# Patient Record
Sex: MALE | Race: BLACK OR AFRICAN AMERICAN | NOT HISPANIC OR LATINO | Employment: FULL TIME | URBAN - METROPOLITAN AREA
[De-identification: names, ages, dates, MRNs, and addresses within clinical notes are randomized per-mention and may not be internally consistent; named-entity substitution may affect disease eponyms.]

---

## 2024-05-14 ENCOUNTER — APPOINTMENT (OUTPATIENT)
Dept: RADIOLOGY | Facility: HOSPITAL | Age: 39
End: 2024-05-14
Payer: COMMERCIAL

## 2024-05-14 ENCOUNTER — HOSPITAL ENCOUNTER (EMERGENCY)
Facility: HOSPITAL | Age: 39
Discharge: HOME | End: 2024-05-14
Payer: COMMERCIAL

## 2024-05-14 VITALS
DIASTOLIC BLOOD PRESSURE: 81 MMHG | HEART RATE: 74 BPM | BODY MASS INDEX: 31.84 KG/M2 | SYSTOLIC BLOOD PRESSURE: 129 MMHG | HEIGHT: 69 IN | WEIGHT: 215 LBS | TEMPERATURE: 98.1 F | RESPIRATION RATE: 14 BRPM | OXYGEN SATURATION: 96 %

## 2024-05-14 DIAGNOSIS — S99.919A ANKLE INJURY, INITIAL ENCOUNTER: ICD-10-CM

## 2024-05-14 DIAGNOSIS — M25.572 ACUTE LEFT ANKLE PAIN: Primary | ICD-10-CM

## 2024-05-14 PROCEDURE — 73630 X-RAY EXAM OF FOOT: CPT | Mod: LEFT SIDE | Performed by: RADIOLOGY

## 2024-05-14 PROCEDURE — 73610 X-RAY EXAM OF ANKLE: CPT | Mod: LEFT SIDE | Performed by: RADIOLOGY

## 2024-05-14 PROCEDURE — 99284 EMERGENCY DEPT VISIT MOD MDM: CPT

## 2024-05-14 PROCEDURE — 73610 X-RAY EXAM OF ANKLE: CPT | Mod: LT

## 2024-05-14 PROCEDURE — 73630 X-RAY EXAM OF FOOT: CPT | Mod: LT

## 2024-05-14 RX ORDER — IBUPROFEN 600 MG/1
600 TABLET ORAL EVERY 6 HOURS PRN
Qty: 28 TABLET | Refills: 0 | Status: SHIPPED | OUTPATIENT
Start: 2024-05-14 | End: 2024-05-21

## 2024-05-14 ASSESSMENT — COLUMBIA-SUICIDE SEVERITY RATING SCALE - C-SSRS
2. HAVE YOU ACTUALLY HAD ANY THOUGHTS OF KILLING YOURSELF?: NO
1. IN THE PAST MONTH, HAVE YOU WISHED YOU WERE DEAD OR WISHED YOU COULD GO TO SLEEP AND NOT WAKE UP?: NO
6. HAVE YOU EVER DONE ANYTHING, STARTED TO DO ANYTHING, OR PREPARED TO DO ANYTHING TO END YOUR LIFE?: NO

## 2024-05-14 ASSESSMENT — LIFESTYLE VARIABLES
HAVE YOU EVER FELT YOU SHOULD CUT DOWN ON YOUR DRINKING: NO
TOTAL SCORE: 0
EVER HAD A DRINK FIRST THING IN THE MORNING TO STEADY YOUR NERVES TO GET RID OF A HANGOVER: NO
EVER FELT BAD OR GUILTY ABOUT YOUR DRINKING: NO
HAVE PEOPLE ANNOYED YOU BY CRITICIZING YOUR DRINKING: NO

## 2024-05-14 ASSESSMENT — PAIN DESCRIPTION - LOCATION: LOCATION: ANKLE

## 2024-05-14 ASSESSMENT — PAIN - FUNCTIONAL ASSESSMENT: PAIN_FUNCTIONAL_ASSESSMENT: 0-10

## 2024-05-14 ASSESSMENT — PAIN SCALES - GENERAL: PAINLEVEL_OUTOF10: 8

## 2024-05-14 ASSESSMENT — PAIN DESCRIPTION - ORIENTATION: ORIENTATION: LEFT

## 2024-05-14 ASSESSMENT — PAIN DESCRIPTION - PAIN TYPE: TYPE: ACUTE PAIN

## 2024-05-14 NOTE — Clinical Note
Hakan Hawthorne was seen and treated in our emergency department on 5/14/2024.  He may return to work on 05/21/2024.       If you have any questions or concerns, please don't hesitate to call.      Mell Morin, APRN-CNP

## 2024-05-14 NOTE — ED TRIAGE NOTES
Patient was playing basketball yesterday and twisted his ankle. Patient is experiencing pain in swelling his left ankle.

## 2024-05-14 NOTE — ED PROVIDER NOTES
Emergency Department Encounter  [facility] EMERGENCY MEDICINE    Patient: Hakan Hawthorne  MRN: [id number]  : 1985  Date of Evaluation: 2024  ED Provider: RISHI Salinas      Chief Complaint       Chief Complaint   Patient presents with    Ankle Pain     [place]    (Location/Symptom, Timing/Onset, Context/Setting, Quality, Duration, Modifying Factors, Severity) Note limiting factors.   Limitations to History: None  Historian: Patient  Records reviewed: EMR inpatient and outpatient notes, Care Everywhere      Hakan Hawthorne is a 38 y.o. male  presents to the emergency department for left foot pain.  He states while playing basketball yesterday he jumped up to make a rebound, he came down and his foot was introverted.  He reports immediate pain after injury and pain with walking.  He denies fever, decrease in sensation, decrease in circulation, numbness or tingling.    ROS:     Review of Systems  14 systems reviewed and otherwise acutely negative except as in the [place].          Past History   No past medical history on file.  No past surgical history on file.  Social History     Socioeconomic History    Marital status: Single     Spouse name: Not on file    Number of children: Not on file    Years of education: Not on file    Highest education level: Not on file   Occupational History    Not on file   Tobacco Use    Smoking status: Not on file    Smokeless tobacco: Not on file   Substance and Sexual Activity    Alcohol use: Not on file    Drug use: Not on file    Sexual activity: Not on file   Other Topics Concern    Not on file   Social History Narrative    Not on file     Social Determinants of Health     Financial Resource Strain: Not on file   Food Insecurity: Not on file   Transportation Needs: Not on file   Physical Activity: Not on file   Stress: Not on file   Social Connections: Not on file   Intimate Partner Violence: Not on file   Housing Stability: Not on file        Medications/Allergies     Previous Medications    No medications on file     Not on File     Physical Exam       ED Triage Vitals [05/14/24 1638]   Temperature Heart Rate Respirations BP   36.7 °C (98.1 °F) 74 14 129/81      Pulse Ox Temp src Heart Rate Source Patient Position   96 % -- -- --      BP Location FiO2 (%)     -- 21 %         Physical Exam  Vitals and nursing note reviewed.   Constitutional:       General: He is not in acute distress.     Appearance: He is not toxic-appearing.   HENT:      Left Ear: External ear normal.      Nose: Nose normal.      Mouth/Throat:      Mouth: Mucous membranes are moist.      Pharynx: Oropharynx is clear.   Eyes:      General:         Left eye: No discharge.      Extraocular Movements: Extraocular movements intact.   Cardiovascular:      Rate and Rhythm: Normal rate and regular rhythm.      Pulses: Normal pulses.      Heart sounds: Normal heart sounds.   Pulmonary:      Effort: Pulmonary effort is normal.      Breath sounds: Normal breath sounds.   Abdominal:      General: Bowel sounds are normal.      Palpations: Abdomen is soft.   Musculoskeletal:      Cervical back: Normal range of motion.      Right ankle: Normal.      Left ankle: Swelling present. No deformity, ecchymosis or lacerations. Tenderness present. Normal range of motion. Normal pulse.      Right foot: Normal.      Left foot: Normal range of motion and normal capillary refill. Tenderness present. No swelling, deformity, laceration or crepitus. Normal pulse.      Comments: There is swelling to the medial and lateral aspect of the left ankle.  There is tenderness of the left lateral ankle extending to the forefoot.  No obvious deformity.  Distal pulses intact.  Brisk capillary refills.  Pain with passive range of motion.   Skin:     General: Skin is warm and dry.   Neurological:      General: No focal deficit present.      Mental Status: He is alert and oriented to person, place, and time.   Psychiatric:          Mood and Affect: Mood normal.         Behavior: Behavior normal.         Thought Content: Thought content normal.           Diagnostics   Labs:  Labs Reviewed - No data to display  Radiographs:  XR ankle left 3+ views   Final Result   There appears to be a tiny avulsed fracture fragment from the dorsal   aspect of the anterior navicular. On the AP view there is also a small   ununited bony fragment along the anterolateral aspect of the   calcaneus. I suspect both of these are old findings although would be   difficult to exclude an acute injury. Correlate with symptoms. Bony   structures of the left foot and ankle otherwise appear intact.   Signed by Willie Aguirre MD      XR foot left 3+ views   Final Result   There appears to be a tiny avulsed fracture fragment from the dorsal   aspect of the anterior navicular. On the AP view there is also a small   ununited bony fragment along the anterolateral aspect of the   calcaneus. I suspect both of these are old findings although would be   difficult to exclude an acute injury. Correlate with symptoms. Bony   structures of the left foot and ankle otherwise appear intact.   Signed by Willie Aguirre MD            Procedures:       EKG: interpreted by this provider  Time:  Indication:  Rate:  Rhythm:  Axis:  Interval:  ST Segment:  Comparison to Prior:      Medical decision making   In brief, Hakan Hawthorne is a 38 y.o. male who presented to the emergency department for left ankle and foot pain s/p injury.  Vitals reviewed.  Upon examination there is swelling to the medial and lateral aspect of the left ankle.  There is tenderness of the left lateral ankle extending to the forefoot.  No obvious deformity.  Distal pulses intact.  Brisk capillary refills.  Pain with passive range of motion..  Differential diagnoses considered include:  1.  Fracture  2.  Sprain versus strain  3.  Muscular pathology  Medical work up includes x-rays.  XR of the left ankle shows a tiny  avulsive fracture fragment from the dorsal aspect of the anterior navicular, age indeterminant, there is also bilateral ankle swelling.  XR of the left foot shows small ununited bony fragment along the anterolateral aspect of the calculus.  Patient declined pain medication.  Ace wrap and Aircast placed to the left ankle.  Patient provided crutches and educated to follow-up with orthopedic in 1 week.  Patient educated not to remove the Ace wrap or Aircast until follow-up with orthopedic services.  Patient verbalized understanding.  Patient provided ibuprofen for discomfort  I discussed the differential, results and discharge plan with the patient. I emphasized the importance of follow-up with the physician I referred them to in the timeframe recommended. I explained reasons for the patient to return to the emergency department. Questions were addressed. They understand return precautions and discharge instructions. The patient  expressed understanding.            ED Course as of 05/14/24 2041 Tue May 14, 2024   1950 XR ankle left 3+ views  There appears to be a tiny avulsed fracture fragment from the dorsal aspect of the anterior navicular. This is age  indeterminate. No other fracture is apparent at the ankle.  The alignment is anatomic.  There is bilateral soft tissue swelling. [ED]   2040 XR foot left 3+ views  There appears to be a tiny avulsed fracture fragment from  the dorsal aspect of the anterior navicular. On the AP view there is  also a small ununited bony fragment along the anterolateral aspect of  the calcaneus. I suspect both of these are old findings although would  be difficult to exclude an acute injury. Correlate with symptoms.  The  alignment is anatomic.  No soft tissue abnormality is seen.   [ED]      ED Course User Index  [ED] RYANNE Salinas-CNP         Diagnoses as of 05/14/24 2041   Acute left ankle pain   Ankle injury, initial encounter      Visit Vitals  /81   Pulse 74   Temp  "36.7 °C (98.1 °F)   Resp 14   Ht 1.753 m (5' 9\")   Wt 97.5 kg (215 lb)   SpO2 96%   BMI 31.75 kg/m²   BSA 2.18 m²       Medications - No data to display          Final Impression    Ankle injury  Left ankle pain    DISPOSITION  Disposition: Discharge  Patient condition is: Stable    Comment: Please note this report has been produced using speech recognition software and may contain errors related to that system including errors in grammar, punctuation, and spelling, as well as words and phrases that may be inappropriate.  If there are any questions or concerns please feel free to contact the dictating provider for clarification.    RISHI Salinas  [facility]  Emergency department     RISHI Salinas  05/14/24 2052    "

## 2024-05-14 NOTE — Clinical Note
Hakan Hawthorne was seen and treated in our emergency department on 5/14/2024.  He may return to work on 05/17/2024.       If you have any questions or concerns, please don't hesitate to call.      Mell Morin, APRN-CNP

## 2024-05-15 NOTE — DISCHARGE INSTRUCTIONS
Rest, apply ice 4 times a day for 15 to 20 minutes.  Elevate.  May take ibuprofen as needed for discomfort  Utilize Aircast and crutches  Follow-up with orthopedic services at [phone]

## 2024-05-17 ENCOUNTER — OFFICE VISIT (OUTPATIENT)
Dept: ORTHOPEDIC SURGERY | Facility: CLINIC | Age: 39
End: 2024-05-17
Payer: COMMERCIAL

## 2024-05-17 VITALS — BODY MASS INDEX: 31.84 KG/M2 | WEIGHT: 215 LBS | HEIGHT: 69 IN

## 2024-05-17 DIAGNOSIS — T14.8XXA AVULSION FRACTURE: ICD-10-CM

## 2024-05-17 DIAGNOSIS — S93.492A SPRAIN OF ANTERIOR TALOFIBULAR LIGAMENT OF LEFT ANKLE, INITIAL ENCOUNTER: Primary | ICD-10-CM

## 2024-05-17 PROCEDURE — 1036F TOBACCO NON-USER: CPT | Performed by: ORTHOPAEDIC SURGERY

## 2024-05-17 PROCEDURE — 99204 OFFICE O/P NEW MOD 45 MIN: CPT | Performed by: ORTHOPAEDIC SURGERY

## 2024-05-17 PROCEDURE — L4361 PNEUMA/VAC WALK BOOT PRE OTS: HCPCS | Performed by: ORTHOPAEDIC SURGERY

## 2024-05-17 PROCEDURE — 99214 OFFICE O/P EST MOD 30 MIN: CPT | Performed by: ORTHOPAEDIC SURGERY

## 2024-05-17 ASSESSMENT — PAIN - FUNCTIONAL ASSESSMENT: PAIN_FUNCTIONAL_ASSESSMENT: 0-10

## 2024-05-17 ASSESSMENT — PAIN SCALES - GENERAL: PAINLEVEL_OUTOF10: 1

## 2024-05-17 NOTE — LETTER
May 17, 2024     Patient: Hakan Hawthorne   YOB: 1985   Date of Visit: 5/17/2024       To Whom It May Concern:    It is my medical opinion that Hakan Hawthorne should remain out of work until 06/05/24 .    If you have any questions or concerns, please don't hesitate to call [phone].         Sincerely,        Charles Johnson MD

## 2024-05-17 NOTE — PROGRESS NOTES
38-year-old male here for left ankle injury.  Injured his left ankle playing basketball Monday night.  Landed on [employer] at a inversion injury.  Has had multiple sprains in the past with no lingering effects.  He could not bear weight that evening.  Was seen in the ER.  Was placed to Ace wrap and stirrup brace.  Still using crutches due to pain.    On exam:  WD/WN athletic male  A+O X3  NAD  No lymphedema  Inspection of both feet and ankles show symmetric arches.   Point tender over lateral collaterals left ankle.  Tender dorsal talar neck.  5/5 strength in all 4 planes.   Sensation intact to LT.   Good pulses.   Stable anterior drawer.  No peroneal subluxation.    (-) Joelle.     I personally reviewed the following radiographic exams: X-ray of left ankle and foot shows small avulsion fracture of the dorsal talus.  Otherwise unremarkable.    Assessment: Severe left ankle sprain with dorsal talar avulsion fracture.    Plan: Discussed nonoperative and operative options in detail.   Risk and benefits discussed in detail. All questions answered today.  Recovery timeline and expectations discussed in detail.  Reassured patient.  Will place in a walking boot for support.  Continue ice and Motrin.  Reevaluate in 2 weeks..  Consider therapy at that time.

## 2024-05-31 ENCOUNTER — OFFICE VISIT (OUTPATIENT)
Dept: ORTHOPEDIC SURGERY | Facility: CLINIC | Age: 39
End: 2024-05-31
Payer: COMMERCIAL

## 2024-05-31 DIAGNOSIS — T14.8XXA AVULSION FRACTURE: ICD-10-CM

## 2024-05-31 DIAGNOSIS — S93.492A SPRAIN OF ANTERIOR TALOFIBULAR LIGAMENT OF LEFT ANKLE, INITIAL ENCOUNTER: Primary | ICD-10-CM

## 2024-05-31 PROCEDURE — 99213 OFFICE O/P EST LOW 20 MIN: CPT | Performed by: ORTHOPAEDIC SURGERY

## 2024-05-31 PROCEDURE — 1036F TOBACCO NON-USER: CPT | Performed by: ORTHOPAEDIC SURGERY

## 2024-05-31 ASSESSMENT — PAIN - FUNCTIONAL ASSESSMENT: PAIN_FUNCTIONAL_ASSESSMENT: NO/DENIES PAIN

## 2024-05-31 NOTE — LETTER
May 31, 2024     Patient: Hakan Hawthorne   YOB: 1985   Date of Visit: 5/31/2024       To Whom It May Concern:    It is my medical opinion that Hakan Hawthorne  should remain off work for the next 3 weeks .    If you have any questions or concerns, please don't hesitate to call [phone].         Sincerely,        Charles Johnson MD

## 2024-05-31 NOTE — PROGRESS NOTES
Returns for left ankle.  Walking boot.  Occasionally walks barefoot around the house.  Has had longstanding stiffness without change.  Pain is improving.  Not ready for work.    Exam: Minimally tender over anterolateral capsule.  Stable anterior drawer.  Stiff subtalar joint.    Assessment: Subacute left ankle sprain.  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    Plan: Can transition from boot to supportive shoewear.  Recommend course of physical therapy.  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  Consider advanced imaging if pain continues.  Reevaluate 2 weeks.

## 2024-06-14 ENCOUNTER — OFFICE VISIT (OUTPATIENT)
Dept: ORTHOPEDIC SURGERY | Facility: CLINIC | Age: 39
End: 2024-06-14
Payer: COMMERCIAL

## 2024-06-14 DIAGNOSIS — T14.8XXA AVULSION FRACTURE: ICD-10-CM

## 2024-06-14 DIAGNOSIS — S93.492A SPRAIN OF ANTERIOR TALOFIBULAR LIGAMENT OF LEFT ANKLE, INITIAL ENCOUNTER: Primary | ICD-10-CM

## 2024-06-14 PROCEDURE — 99213 OFFICE O/P EST LOW 20 MIN: CPT | Performed by: ORTHOPAEDIC SURGERY

## 2024-06-14 PROCEDURE — 1036F TOBACCO NON-USER: CPT | Performed by: ORTHOPAEDIC SURGERY

## 2024-06-14 ASSESSMENT — PAIN SCALES - GENERAL: PAINLEVEL_OUTOF10: 2

## 2024-06-14 ASSESSMENT — PAIN - FUNCTIONAL ASSESSMENT: PAIN_FUNCTIONAL_ASSESSMENT: 0-10

## 2024-06-14 ASSESSMENT — PAIN DESCRIPTION - DESCRIPTORS: DESCRIPTORS: ACHING

## 2024-06-14 NOTE — PROGRESS NOTES
Patient was reviewed and discussed with CHIKA and/or orthopedic resident.  Patient was seen and evaluated in conjunction with CHIKA and/or orthopedic resident. Findings and treatment plan were discussed and approved by myself, Dr. Johnson.    He is awaiting his physical therapy and in the months.  Does not feel he is able get his steel toed boots on to return to work at this time.    Exam: Stable anterior drawer.  Mild swelling.  Stiff subtalar joint.    Assessment: Left ankle sprain.    Plan: Discussed nonoperative and operative options in detail.   Risk and benefits discussed in detail. All questions answered today.  Recovery timeline and expectations discussed in detail.    Recommend he reach out to PT to see if they get him in earlier.  And work on exercise on his own in the meantime.  Reevaluate 1 month.

## 2024-06-14 NOTE — PROGRESS NOTES
Returns for left ankle.  No longer in Walking boot.  Has been wearing strap up ankle brace which has been helping. Pain is improving. Has not been doing PT yet, first session starts end of June.  Has had longstanding stiffness without change.  Not ready for work.     Exam: Minimally tender over anterolateral capsule.  Stable anterior drawer.  Stiff subtalar joint.     Assessment: Subacute left ankle sprain.  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     Plan:  Recommend course of physical therapy.  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  Consider advanced imaging if pain continues. He has no restrictions at this point and may return to work when he feels he is able to wear his steel toed boot and stand for extended period of time. PT will help with this.     Bib Robledo, DO

## 2024-07-02 ENCOUNTER — APPOINTMENT (OUTPATIENT)
Dept: PRIMARY CARE | Facility: CLINIC | Age: 39
End: 2024-07-02
Payer: COMMERCIAL

## 2024-07-02 VITALS
RESPIRATION RATE: 16 BRPM | SYSTOLIC BLOOD PRESSURE: 120 MMHG | HEART RATE: 68 BPM | DIASTOLIC BLOOD PRESSURE: 80 MMHG | WEIGHT: 217.2 LBS | BODY MASS INDEX: 32.07 KG/M2 | TEMPERATURE: 98.6 F

## 2024-07-02 DIAGNOSIS — E66.09 CLASS 1 OBESITY DUE TO EXCESS CALORIES WITHOUT SERIOUS COMORBIDITY WITH BODY MASS INDEX (BMI) OF 30.0 TO 30.9 IN ADULT: ICD-10-CM

## 2024-07-02 DIAGNOSIS — K58.9 IRRITABLE BOWEL SYNDROME, UNSPECIFIED TYPE: ICD-10-CM

## 2024-07-02 DIAGNOSIS — R29.818 SUSPECTED SLEEP APNEA: ICD-10-CM

## 2024-07-02 DIAGNOSIS — R73.9 HYPERGLYCEMIA: Primary | ICD-10-CM

## 2024-07-02 PROBLEM — E66.811 CLASS 1 OBESITY DUE TO EXCESS CALORIES WITHOUT SERIOUS COMORBIDITY WITH BODY MASS INDEX (BMI) OF 30.0 TO 30.9 IN ADULT: Status: ACTIVE | Noted: 2024-07-02

## 2024-07-02 PROCEDURE — 1036F TOBACCO NON-USER: CPT | Performed by: INTERNAL MEDICINE

## 2024-07-02 PROCEDURE — 99385 PREV VISIT NEW AGE 18-39: CPT | Performed by: INTERNAL MEDICINE

## 2024-07-02 PROCEDURE — 3008F BODY MASS INDEX DOCD: CPT | Performed by: INTERNAL MEDICINE

## 2024-07-02 NOTE — PROGRESS NOTES
Hakan Hawthorne is a 38 y.o. male   New patient visit    Patient with a past medical history of ankle torn ligament, hyperglycemia    Patient Reports That His Girlfriend Has Told Him Breathing in the Middle of the Night  He also reports feeling sleepy during the daytime's especially after a meal  She also checked his blood sugar which was 180  Denies any thirst urination etc.    Does have frequent bowel movement throughout the day  Family is concerned about that  No chest pain/  SOB/ dizziness  BM OK  Energy level ok  Appetite OK               Review of Systems     Constitutional: not feeling poorly, no fever, no recent weight gain and no recent weight loss.   Eyes: no blurred vision and no diplopia.   ENT: no hearing loss, no tinnitus, no earache, no sore throat, no hoarseness and no swollen glands in the neck.   Cardiovascular: no chest pain, no tightness or heavy pressure, no shortness of breath, no palpitations and no lower extremity edema.   Respiratory: no cough, wheezing or shortness of breath at rest or exertion  Gastrointestinal: no change in bowel habits, no diarrhea, no constipation, no bloody stools, no nausea, no vomiting, no abdominal pain, no signs and symptoms of ulcer disease, no sil colored stools and no intolerance to fatty foods.   Genitourinary: no urinary frequency, no dysuria, no hematuria, no burning sensation during urination, urinary stream is not smaller and urinary stream does not start and stop.   Musculoskeletal: no arthralgias, no joint stiffness, no muscle weakness, no back pain and no difficulty walking.   Skin: no rashes, no change in skin color and pigmentation, no skin lesions and no skin lumps.   Neurological: no headaches, no dizziness, no seizures, no tingling, no numbness, no signs and symptoms of stroke and no limb weakness.   Psychiatric: no confusion, no memory lapses or loss, no depression and no sleep disturbances.   Endocrine: no goiter, no thyroid disorder, no  diabetes mellitus, no excessive thirst, no dry skin, no cold intolerance, no heat intolerance and no increased urinary frequency.   Hematologic/Lymphatic: is not slow to heal, does not bleed easily, does not bruise easily, no thrombophlebitis, no anemia and no history of blood transfusion.   All other systems have been reviewed and are negative for complaint.     Vitals:    07/02/24 1448   BP: 120/80   Pulse: 68   Resp: 16   Temp: 37 °C (98.6 °F)        Physical Exam     Constitutional   General appearance: Alert and in no acute distress.   Eyes   Inspection of eyes: Sclera and conjunctiva were normal.    Pupil exam: Pupils were equal in size. Extraocular movements were intact.   Pulmonary   Respiratory assessment: No respiratory distress, normal respiratory rhythm and effort.    Auscultation of Lungs: Clear bilateral breath sounds.   Cardiovascular   Auscultation of heart: Apical pulse normal, heart rate and rhythm normal, normal S1 and S2, no murmurs and no pericardial rub.    Exam for edema: No peripheral edema.   Abdomen   Abdominal Exam: No bruits, normal bowel sounds, soft, non-tender, no abdominal mass palpated.    Liver and Spleen exam: No hepato-splenomegaly.   Musculoskeletal   Examination of gait: Normal.    Inspection of digits and nails: No clubbing or cyanosis of the fingernails.    Inspection/palpation of joints, bones and muscles: No joint swelling. Normal movement of all extremities.   Skin   Skin inspection: Normal skin color and pigmentation, normal skin turgor and no visible rash.   Neurologic   Cranial nerves: Nerves 2-12 were intact, no focal neuro defects.   Psychiatric   Orientation: Oriented to person, place, and time.    Mood and affect: Normal.       Assessment/Plan          Patient with a past medical history of ankle torn ligament, hyperglycemia    #Suspected sleep apnea  #Hyperglycemia as noted by family during occasional fingersticks  Unclear if this was pre or post meals  #Frequent  bowel movements  No other warning signs of bleeding cramping pain etc.    Need to rule out IBS  Will get complete blood work  Set up for sleep study  Gastroenterology input

## 2024-07-10 ENCOUNTER — LAB (OUTPATIENT)
Dept: LAB | Facility: LAB | Age: 39
End: 2024-07-10
Payer: COMMERCIAL

## 2024-07-10 ENCOUNTER — EVALUATION (OUTPATIENT)
Dept: PHYSICAL THERAPY | Facility: CLINIC | Age: 39
End: 2024-07-10
Payer: COMMERCIAL

## 2024-07-10 DIAGNOSIS — E66.09 CLASS 1 OBESITY DUE TO EXCESS CALORIES WITHOUT SERIOUS COMORBIDITY WITH BODY MASS INDEX (BMI) OF 30.0 TO 30.9 IN ADULT: ICD-10-CM

## 2024-07-10 DIAGNOSIS — T14.8XXA AVULSION FRACTURE: ICD-10-CM

## 2024-07-10 DIAGNOSIS — M25.572 ACUTE LEFT ANKLE PAIN: Primary | ICD-10-CM

## 2024-07-10 DIAGNOSIS — S93.492A SPRAIN OF ANTERIOR TALOFIBULAR LIGAMENT OF LEFT ANKLE, INITIAL ENCOUNTER: ICD-10-CM

## 2024-07-10 DIAGNOSIS — R73.9 HYPERGLYCEMIA: ICD-10-CM

## 2024-07-10 LAB
ALBUMIN SERPL BCP-MCNC: 4 G/DL (ref 3.4–5)
ALP SERPL-CCNC: 60 U/L (ref 33–120)
ALT SERPL W P-5'-P-CCNC: 24 U/L (ref 10–52)
ANION GAP SERPL CALC-SCNC: 11 MMOL/L (ref 10–20)
AST SERPL W P-5'-P-CCNC: 14 U/L (ref 9–39)
BILIRUB SERPL-MCNC: 0.8 MG/DL (ref 0–1.2)
BUN SERPL-MCNC: 13 MG/DL (ref 6–23)
CALCIUM SERPL-MCNC: 8.7 MG/DL (ref 8.6–10.3)
CHLORIDE SERPL-SCNC: 102 MMOL/L (ref 98–107)
CHOLEST SERPL-MCNC: 204 MG/DL (ref 0–199)
CHOLESTEROL/HDL RATIO: 4.6
CO2 SERPL-SCNC: 30 MMOL/L (ref 21–32)
CREAT SERPL-MCNC: 1.12 MG/DL (ref 0.5–1.3)
EGFRCR SERPLBLD CKD-EPI 2021: 87 ML/MIN/1.73M*2
ERYTHROCYTE [DISTWIDTH] IN BLOOD BY AUTOMATED COUNT: 13.4 % (ref 11.5–14.5)
EST. AVERAGE GLUCOSE BLD GHB EST-MCNC: 123 MG/DL
GLUCOSE SERPL-MCNC: 101 MG/DL (ref 74–99)
HBA1C MFR BLD: 5.9 %
HCT VFR BLD AUTO: 43.2 % (ref 41–52)
HDLC SERPL-MCNC: 43.9 MG/DL
HGB BLD-MCNC: 15 G/DL (ref 13.5–17.5)
LDLC SERPL CALC-MCNC: 128 MG/DL
MCH RBC QN AUTO: 26.8 PG (ref 26–34)
MCHC RBC AUTO-ENTMCNC: 34.7 G/DL (ref 32–36)
MCV RBC AUTO: 77 FL (ref 80–100)
NON HDL CHOLESTEROL: 160 MG/DL (ref 0–149)
NRBC BLD-RTO: 0 /100 WBCS (ref 0–0)
PLATELET # BLD AUTO: 277 X10*3/UL (ref 150–450)
POTASSIUM SERPL-SCNC: 4.1 MMOL/L (ref 3.5–5.3)
PROT SERPL-MCNC: 6.3 G/DL (ref 6.4–8.2)
RBC # BLD AUTO: 5.59 X10*6/UL (ref 4.5–5.9)
SODIUM SERPL-SCNC: 139 MMOL/L (ref 136–145)
TRIGL SERPL-MCNC: 159 MG/DL (ref 0–149)
TSH SERPL-ACNC: 1.63 MIU/L (ref 0.44–3.98)
VLDL: 32 MG/DL (ref 0–40)
WBC # BLD AUTO: 6.1 X10*3/UL (ref 4.4–11.3)

## 2024-07-10 PROCEDURE — 36415 COLL VENOUS BLD VENIPUNCTURE: CPT

## 2024-07-10 PROCEDURE — 97161 PT EVAL LOW COMPLEX 20 MIN: CPT | Mod: GP

## 2024-07-10 PROCEDURE — 84443 ASSAY THYROID STIM HORMONE: CPT

## 2024-07-10 PROCEDURE — 80061 LIPID PANEL: CPT

## 2024-07-10 PROCEDURE — 80053 COMPREHEN METABOLIC PANEL: CPT

## 2024-07-10 PROCEDURE — 83036 HEMOGLOBIN GLYCOSYLATED A1C: CPT

## 2024-07-10 PROCEDURE — 85027 COMPLETE CBC AUTOMATED: CPT

## 2024-07-10 PROCEDURE — 97110 THERAPEUTIC EXERCISES: CPT | Mod: GP

## 2024-07-10 ASSESSMENT — ENCOUNTER SYMPTOMS
OCCASIONAL FEELINGS OF UNSTEADINESS: 1
LOSS OF SENSATION IN FEET: 0
DEPRESSION: 0

## 2024-07-10 ASSESSMENT — PAIN - FUNCTIONAL ASSESSMENT: PAIN_FUNCTIONAL_ASSESSMENT: 0-10

## 2024-07-10 ASSESSMENT — PAIN SCALES - GENERAL: PAINLEVEL_OUTOF10: 4

## 2024-07-10 NOTE — PROGRESS NOTES
Physical Therapy  Physical Therapy Orthopedic Evaluation    Patient Name: Hakan Hawthorne  MRN: [id number]  Today's Date: 7/10/2024  Time Calculation  Start Time: 0700  Stop Time: 0749  Time Calculation (min): 49 min    Insurance:  Visit number: 1 of 60: PT/OT/SLP  Authorization info: none  Insurance Type: C[employer]    General:  Reason for visit: L avulsion fx ankle/foot  Referred by: Charles Johnson MD     Current Problem:  1. Acute left ankle pain  Follow Up In Physical Therapy      2. Sprain of anterior talofibular ligament of left ankle, initial encounter  Referral to Physical Therapy    Follow Up In Physical Therapy      3. Avulsion fracture  Referral to Physical Therapy    Follow Up In Physical Therapy          Precautions: avulsion fx  Precautions  STEADI Fall Risk Score (The score of 4 or more indicates an increased risk of falling): 0  Precautions Comment: avulsion fx- return to work 7/21      Medical History Form: Reviewed (scanned into chart)    Subjective:     Chief Complaint: Patient presents to clinic L ankle and foot pain after traumatic sprain and avulsion fx  Onset Date: 5/15/24  LOREN: Basketball landed on teammates foot coming down from rebound    Current Condition:   Better    Pain:  Pain Assessment: 0-10  0-10 (Numeric) Pain Score: 4  Pain Location: Ankle  Pain Orientation: Left  Location: L dorsal foot and ankle and achilles  Description: can be sharp and achilles is a tight pulling pain  Aggravating Factors: Plantarflexion, Inversion, Walking, Stairs, and Squatting  Relieving Factors:  Rest and Ice boot    Relevant Information (PMH & Previous Tests/Imaging):   IMPRESSION:  There appears to be a tiny avulsed fracture fragment from the dorsal  aspect of the anterior navicular. On the AP view there is also a small  ununited bony fragment along the anterolateral aspect of the  calcaneus. I suspect both of these are old findings although would be  difficult to exclude an acute injury. Correlate with  symptoms. Bony  structures of the left foot and ankle otherwise appear intact.  Previous Interventions/Treatments: None    Prior Level of Function (PLOF)  Patient previously independent with all ADLs  Exercise/Physical Activity: basketball 2x/week, playing with kids, working out, walking, independent with ADLs  Work/School: independent -  in [employer], goes back 7/21/24     Patients Living Environment: Reviewed and no concern    Primary Language: English    Patient's Goal(s) for Therapy: be able to run and jump again, get back to normal with strength , wbing and all ankle ROM back.    Red Flags: Do you have any of the following? No  Fever/chills, unexplained weight changes, dizziness/fainting, unexplained change in bowel or bladder functions, unexplained malaise or muscle weakness, night pain/sweats, numbness or tingling    Objective:  Objective     Dermatomes/Myotomes    L1: Grossly Intact  L2: Grossly Intact  L3: Grossly Intact  L4: Grossly Intact  L5: Grossly Intact  S1: Grossly Intact    L1: 5/5  L2: 5/5  L3: 5/5  L4: 5/5  L5: 5/5  S1: 5/5        ROM       Knee AROM (Degrees)      (R)  (L)  Flexion: WNL  WNL   Extension: WNL  WNL        Knee PROM (Degrees)      (R)  (L)  Flexion: WNL  WNL     Extension: WNL  WNL        Ankle AROM (Degrees)      (R)  (L)  Plantarflexion: 40  30    Dorsiflexion: 5  -8P!    Inversion: 25  20     Eversion: 25  10         Ankle PROM (Degrees)      (R)  (L)  Plantarflexion: 50  50     Dorsiflexion: 10  3    Inversion: 35  35P!     Eversion: 25  25             Strength Testing    Hip    (R)  (L)  Flexion: 5/5  5/5     Extension: 5/5  5/5    Abduction: 5/5  5/5    Adduction: 5/5  5/5        Knee    (R)  (L)  Flexion: 5/5  5/5     Extension: 5/5  5/5        Ankle    (R)  (L)  Plantarflexion: 5/5  3+/5      Dorsiflexion: 5/5  4/5    Inversion: 5/5  3+/5     Eversion: 5/5  3+/5         Posture: bilateral ankle pronation, increased toe adduction on L      Palpation:  TTP L medal achilles and navicular, lateral ankle distal lateral malleolus      Ankle/Foot Joint Mobility: limited due to pain and swelling on L      Observation: swelling L ankle, more prominent at medial malleolus      Orthotics: none      Gait: decreased heel strike and pushoff        Functional Screening  Squat: hip shift to R  decreased depth  Lunge: NT  Lateral Step Down: NT  SL Balance: R= 30 sec, L = 10 secP!  SL Quarter Squat: NT        Special Tests    Anterior Drawer Test: P-L plantar flexed  Talar Tilt Test: P L    Eversion Stress Test: N  Limon Test: N      Outcome Measures:  Other Measures  Lower Extremity Funtional Score (LEFS): 56/80= 70%function           EDUCATION: Home exercise program, plan of care, activity modifications, pain management, and injury pathology       Goals: Set and discussed today  Active       PT Problem       PT Goal 1       Start:  07/10/24    Expected End:  08/21/24       STG  Pt will be independent with home exercise program for self management of symptoms.  Pt will improve PROM and AROM of L ankle to be equal to contralateral side without pain to progress towards improved performance in ADLs in 6 weeks  Pt will improve MMT of L ankle in all planes to 4+/5 or greater to show imrpoved foot and ankle stability to porgress towards safe return to work in 4-6 weeks           PT Goal 2       Start:  07/10/24    Expected End:  10/30/24       LTG 12-16weeks  Pt will increase LEFS outcome score to 80/80 to demonstrate improved functional mobility and community access in.  Pt will be able to ambulate up&down 2 flights of stairs w/o increase in pain to improve functional mobility and community access.  Pt will ambulate for full day on level surface without increase in pain and w/ minimal limp or normalized gait pattern in.  Pt will verbalize understanding of appropriate weight lifting mechanics to promote safe return to exercise without increase in joint stress.  Pt will perform all  SL hop testing at 90% LSI or greater for safe return to work/sport/running.               Plan of care was developed with input and agreement by the patient    Treatment Performed:  Codes:  Low complex eval  TEx2    Access Code: [email or web address]  URL: [email or web address]  Date: 07/10/2024  Prepared by: Zana Sage    Exercises  - Ankle Alphabet in Elevation  - 1-5 x daily - 7 x weekly - 3 sets - 10 reps  - Towel Scrunches  - 1 x daily - 7 x weekly - 3 sets - 10 reps  - Long Sitting Calf Stretch with Strap  - 1 x daily - 7 x weekly - 3 sets - 30 hold  - Long Sitting Ankle Eversion with Resistance  - 1 x daily - 7 x weekly - 3 sets - 10-15 reps  - Long Sitting Ankle Plantar Flexion with Resistance  - 1 x daily - 7 x weekly - 3 sets - 10-15 reps  - Long Sitting Ankle Dorsiflexion with Anchored Resistance  - 1 x daily - 7 x weekly - 3 sets - 10-15 reps  - Seated Heel Raise  - 1 x daily - 7 x weekly - 3 sets - 20 reps  - Gastroc Stretch on Wall  - 1 x daily - 7 x weekly - 3 sets - 10 reps  - Long Sitting Ankle Inversion with Anchored Resistance  - 1 x daily - 7 x weekly - 3 sets - 15 reps    Assessment: Patient presents with signs and symptoms consistent with L ankle sprain with avulsion fx, resulting in limited participation in pain-free ADLs and inability to perform at their prior level of function. Pt would benefit from physical therapy to address the impairments found & listed previously in the objective section in order to return to safe and pain-free ADLs and prior level of function.  PT Assessment Results: Decreased strength, Decreased range of motion, Decreased endurance, Impaired balance, Decreased mobility, Decreased coordination, Pain  Rehab Prognosis: Excellent    Clinical Presentation: Stable      Plan:  Rehab Potential: Excellent  Plan of Care Agreement: Patient  Planned Interventions include: therapeutic exercise, self-care home management, manual therapy, therapeutic activities,  gait training, neuromuscular coordination, vasopneumatic, dry needling, aquatic therapy  Frequency: 1-2 x Week  Duration: 16 weeks      Zana Sage PT, DPT #[id number]

## 2024-07-23 ENCOUNTER — TREATMENT (OUTPATIENT)
Dept: PHYSICAL THERAPY | Facility: CLINIC | Age: 39
End: 2024-07-23
Payer: COMMERCIAL

## 2024-07-23 DIAGNOSIS — S93.492A SPRAIN OF ANTERIOR TALOFIBULAR LIGAMENT OF LEFT ANKLE, INITIAL ENCOUNTER: ICD-10-CM

## 2024-07-23 DIAGNOSIS — T14.8XXA AVULSION FRACTURE: ICD-10-CM

## 2024-07-23 DIAGNOSIS — M25.572 ACUTE LEFT ANKLE PAIN: ICD-10-CM

## 2024-07-23 PROCEDURE — 97112 NEUROMUSCULAR REEDUCATION: CPT | Mod: GP

## 2024-07-23 PROCEDURE — 97016 VASOPNEUMATIC DEVICE THERAPY: CPT | Mod: GP

## 2024-07-23 PROCEDURE — 97110 THERAPEUTIC EXERCISES: CPT | Mod: GP

## 2024-07-23 ASSESSMENT — PAIN SCALES - GENERAL: PAINLEVEL_OUTOF10: 0 - NO PAIN

## 2024-07-23 ASSESSMENT — PAIN - FUNCTIONAL ASSESSMENT: PAIN_FUNCTIONAL_ASSESSMENT: 0-10

## 2024-07-23 NOTE — PROGRESS NOTES
Physical Therapy  Physical Therapy Treatment    Patient Name: Hakan Hawthorne  MRN: [id number]  Today's Date: 7/23/2024  Time Calculation  Start Time: 0731  Stop Time: 0825  Time Calculation (min): 54 min    Insurance:  Visit number: 2 of 60: PT/OT/SLP  Authorization info: none  Insurance Type: C[employer]a     General:  Reason for visit: L avulsion fx ankle/foot  Referred by: Charles Johnson MD     Current Problem  1. Sprain of anterior talofibular ligament of left ankle, initial encounter  Follow Up In Physical Therapy      2. Avulsion fracture  Follow Up In Physical Therapy      3. Acute left ankle pain  Follow Up In Physical Therapy          Precautions  Precautions Comment: avulsion fx- return to work soon    Pain Assessment: 0-10  0-10 (Numeric) Pain Score: 0 - No pain  Pain Location: Ankle  Pain Orientation: Left    Subjective:   Patient reports L ankle continuing to get better, is scheduling appointment with physician to get cleared for return to work. Most trouble with loaded active plantar flexion, also difficulty with getting going after prolonged activity, feels really stiff in L ankle.  HEP Performed:  Yes    Objective:     L ankle effusion 1+  Biodex bal 10% @lvl10    Treatments:   Upright bike: x5-10 5 min 1.5mile  Biodex PS: lvl12 x3min  Biodex LOS: lvl 12 @ 1:38min= 20%; lvl12 @1:03=30%  Biodex LOS lvl10 @ = 2:38min 10%  Floss band PROM x3min  Floss band AAROM: x3min  Floss band ABCs: x 2 full set  Seated calf raise x25  Standing calf with ecc focus (DBL) 2x15  Standing tennis ball post tib raise: x15    ice post session in heel elevated [id number] min- not charged    Charges: Tex2 NMRx1 ice - not charged    Assessment:   The focus of the session was Strengthening, ROM, Stretching, Balance, Motor Control, Dynamic Stability Training, and functional training. The pt demonstrated Fair and Improving tolerance to the noted exercises today. The pt is demonstrated Fair and Improving progress in skilled rehab at  this time. The pt is still limited in overall Strength, ROM, Flexibility, Motor control, Balance, Gait mechanics, Effusion, and Pain at this time. The pt continues to be a good candidate for skilled PT, in order to further improve Strength, ROM, Flexibility, Motor control, Balance, Gait mechanics, Effusion, and Pain. Increased ankle righting strategies for biodex for postural stability and limits of stability , continuing to need to improve ROM and stability in all planes for safe return to work, rec, wbing activity. Iso with compress to decrease pain at 4/10 by end of session and swelling 1+ after exercise and improve recovery.    Plan: ankle ROM and strength all planes, add load to seated calf raise, balance perts, step ups    Zana Sage PT, DPT #[id number]

## 2024-08-02 ENCOUNTER — TREATMENT (OUTPATIENT)
Dept: PHYSICAL THERAPY | Facility: CLINIC | Age: 39
End: 2024-08-02
Payer: COMMERCIAL

## 2024-08-02 DIAGNOSIS — M25.572 ACUTE LEFT ANKLE PAIN: ICD-10-CM

## 2024-08-02 DIAGNOSIS — S93.492A SPRAIN OF ANTERIOR TALOFIBULAR LIGAMENT OF LEFT ANKLE, INITIAL ENCOUNTER: Primary | ICD-10-CM

## 2024-08-02 DIAGNOSIS — T14.8XXA AVULSION FRACTURE: ICD-10-CM

## 2024-08-02 PROCEDURE — 97110 THERAPEUTIC EXERCISES: CPT | Mod: GP,CQ

## 2024-08-02 PROCEDURE — 97140 MANUAL THERAPY 1/> REGIONS: CPT | Mod: GP,CQ

## 2024-08-02 ASSESSMENT — PAIN SCALES - GENERAL: PAINLEVEL_OUTOF10: 0 - NO PAIN

## 2024-08-02 ASSESSMENT — PAIN - FUNCTIONAL ASSESSMENT: PAIN_FUNCTIONAL_ASSESSMENT: 0-10

## 2024-08-02 NOTE — PROGRESS NOTES
Physical Therapy  Physical Therapy Treatment    Patient Name: Hakan Hawthorne  MRN: [id number]  Today's Date: 8/2/2024  Time Calculation  Start Time: 0752  Stop Time: 0831  Time Calculation (min): 39 min    Insurance:  Visit number: 3 of 60: PT/OT/SLP  Authorization info: none  Insurance Type: Cigna     General:  Reason for visit: L avulsion fx ankle/foot  Referred by: Charles Johnson MD     Current Problem  1. Sprain of anterior talofibular ligament of left ankle, initial encounter  Follow Up In Physical Therapy      2. Avulsion fracture  Follow Up In Physical Therapy      3. Acute left ankle pain  Follow Up In Physical Therapy            Precautions  STEADI Fall Risk Score (The score of 4 or more indicates an increased risk of falling): 0  Precautions Comment: avulsion fx- return to work soon    Pain Assessment: 0-10  0-10 (Numeric) Pain Score: 0 - No pain  Pain Location: Ankle  Pain Orientation: Left    Subjective:   Patient reports ankle is feeling better and patient reports returning back to work on Wednesday.   HEP Performed:  Yes    Objective:   Dorsiflexion 7 degrees AROM     Treatments:   Upright bike: x5-10 5 min 1.5mile  Seated PF 20# 2x15   Standing calf raises with increased depth standing on step 2x15  PROM DF/PF/INVERSION/EVERSION 10'   Standing dorsiflexion with back against the wall  2x15   Charges: manual x1 TE x2    Assessment:   Pt tolerated treatment session fairly having minor increases in pain during standing calf raises and dorsiflexion. Pt was able to tolerate more DF during PROM with minor anterior pain.     Plan:   Attempt brown board isometrics starting at 7.5# dorsiflexion    Donta Vences

## 2024-08-05 PROBLEM — E78.5 HYPERLIPIDEMIA: Status: ACTIVE | Noted: 2022-11-09

## 2024-08-05 RX ORDER — DIAZEPAM 10 MG/1
TABLET ORAL
COMMUNITY
Start: 2023-08-23

## 2024-08-05 RX ORDER — OXYCODONE AND ACETAMINOPHEN 5; 325 MG/1; MG/1
TABLET ORAL
COMMUNITY
Start: 2023-08-23

## 2024-08-06 ENCOUNTER — TREATMENT (OUTPATIENT)
Dept: PHYSICAL THERAPY | Facility: CLINIC | Age: 39
End: 2024-08-06
Payer: COMMERCIAL

## 2024-08-06 DIAGNOSIS — S93.492A SPRAIN OF ANTERIOR TALOFIBULAR LIGAMENT OF LEFT ANKLE, INITIAL ENCOUNTER: ICD-10-CM

## 2024-08-06 DIAGNOSIS — T14.8XXA AVULSION FRACTURE: ICD-10-CM

## 2024-08-06 DIAGNOSIS — M25.572 ACUTE LEFT ANKLE PAIN: ICD-10-CM

## 2024-08-06 PROCEDURE — 97110 THERAPEUTIC EXERCISES: CPT | Mod: GP

## 2024-08-06 PROCEDURE — 97112 NEUROMUSCULAR REEDUCATION: CPT | Mod: GP

## 2024-08-06 ASSESSMENT — PAIN SCALES - GENERAL: PAINLEVEL_OUTOF10: 2

## 2024-08-06 ASSESSMENT — PAIN - FUNCTIONAL ASSESSMENT: PAIN_FUNCTIONAL_ASSESSMENT: 0-10

## 2024-08-06 NOTE — PROGRESS NOTES
Physical Therapy  Physical Therapy Treatment    Patient Name: Hakan Hawthorne  MRN: [id number]  Today's Date: 8/6/2024  Time Calculation  Start Time: 0944  Stop Time: 1038  Time Calculation (min): 54 min    Insurance:  Visit number: 4 of 60: PT/OT/SLP  Authorization info: none  Insurance Type: Cigna     General:  Reason for visit: L avulsion fx ankle/foot  Referred by: Charles Johnson MD     Current Problem  1. Sprain of anterior talofibular ligament of left ankle, initial encounter  Follow Up In Physical Therapy      2. Avulsion fracture  Follow Up In Physical Therapy      3. Acute left ankle pain  Follow Up In Physical Therapy              Precautions  Precautions Comment: avulsion fx- return to work soon    Pain Assessment: 0-10  0-10 (Numeric) Pain Score: 2  Pain Location: Ankle  Pain Orientation: Left    Subjective:   Patient reports doing better overall goes back to work tomorrow. Still not ready for dynamic loading like basketball or running and feels out of shape but doing better.  HEP Performed:  Yes    Objective:   Dorsiflexion 7 degrees AROM   Biodex bal 10% @lvl10 - 19% today (much improved)  Weakest with active eversion/pronation  Treatments:   Upright bike: intervals 15 to 4: 45 sec on 30 sec off x5min total=1.75miles  FF isos:  - pos1 x60 sec L&R  -pos2 x45 sec ea  -pos3 x60sec R; x35 sec L  Biodex lvl 10 19%  Biodex R lvl12 BUE hold- 40%  Biodex L lvl 12 BUE hold- 14%  Foot iso board: anti [id number]; anti inv 7.5lbs x2min (hardest)  Foot iso board: anti [id number]; anti DF 15lbs x2min  Post tib tennis ball smash raise x30  Standing split stance soleus raise x20 12kg w/ op  Standing dorsiflexion with back against the wall  2x25  Charges: Tex2 NMRx1, ice untimed - not billed    Assessment:   The focus of the session was Strengthening, ROM, Stretching, Balance, Motor Control, Dynamic Stability Training, and functional training. The pt demonstrated Good and Improving tolerance to the noted  exercises today. The pt is demonstrated Good and Improving progress in skilled rehab at this time. The pt is still limited in overall Strength, ROM, Flexibility, Motor control, Balance, Gait mechanics, and Pain at this time. The pt continues to be a good candidate for skilled PT, in order to further improve Strength, ROM, Flexibility, Motor control, Balance, Gait mechanics, and Pain. Pt did show weakness with eversion and pronation in CKC positions on biodex and iso board. Pt also tolerated progression of intensity of exercise with intervals and CKC loading to progress towards goals of return to rec /sport improved wbing. Cold compress in elevated position for post exercises soreness and residual swelling to improve recovery and progress towards goals.      Plan:   Iso board 10 or more standing calf. FF iso progression. manual    Zana Sage PT, DPT #[id number]

## 2024-08-08 ENCOUNTER — APPOINTMENT (OUTPATIENT)
Dept: SLEEP MEDICINE | Facility: CLINIC | Age: 39
End: 2024-08-08
Payer: COMMERCIAL

## 2024-08-14 NOTE — PROGRESS NOTES
Hakan Hawthorne is a 38 y.o. male who is referred by PCP Dr. Inder Mortensen for irritable bowel syndrome. He states he has 4 bowel movements per day for as long as he can remember. Stools are typically loose but he will occasionally have a formed stool. Occasional nocturnal awakening but he states he works night shift so has alternating schedule. However there have been times that he has woken up from a deep sleep to have BM. He has had 3 episodes of rectal bleeding (2 with wiping and 1 with blood in the toilet bowl). Frequent abdominal cramping associated with defecation. No nausea, vomiting, bloating, unintentional weight loss. He has tried probiotic but this did not help. No NSAIDS. He states working third shift he does not eat very healthy. He eats out a lot. He has no anemia but MCV noted to be low at 77. TSH normal. No prior EGD or colonoscopy. Surgical history includes neck surgery.    Social history: Never tobacco. Occasional alcohol. Smokes marijuana.    Family history: Denies family history of colon cancer or other GI disorders or malignancy.     Past Surgical History:   Procedure Laterality Date    BACK SURGERY      LEG SURGERY      LIPOMA RESECTION       No current outpatient medications on file.     No current facility-administered medications for this visit.       Review of Systems  Review of Systems negative except as noted in HPI.    Objective     /83   Pulse 72   Temp 36.2 °C (97.1 °F)   Wt 101 kg (222 lb 12.8 oz)   SpO2 95%   BMI 32.90 kg/m²      Physical Exam  Constitutional:  No acute distress. Normal appearance. Not ill-appearing.  HENT:  Head normocephalic and atraumatic. Conjunctivae normal.  Cardiovascular:  Normal rate. Regular rhythm.  Pulmonary:  Pulmonary effort normal. No respiratory distress. Breath sounds clear.  Abdominal:  Abdomen is flat and soft. There is no distension. No tenderness or guarding.  Skin: Dry.  Neurological:  Alert and oriented.  Psychiatric:  Mood and  affect normal.    Assessment/Plan     38 y.o. male who presents today for initial clinic visit for chronic loose stools with cramping and occasional nocturnal awakening. He has had 3 instances of small volume rectal bleeding. Suspect that he may have IBS however his MCV is noted to be low at 77. We will obtain fecal calprotectin to rule out IBD and discussed diet changes/fiber supplement to hopefully bulk the stool.    Recommendations  Try to eliminate dairy and processed foods as these are common triggers for IBS.  Start fiber supplement (Metamucil). We would start with 1 teaspoon daily in 8 ounces of water and then after a week increase to 2 teaspoons daily and then in the third week increase to 3 teaspoons daily (1 tablespoon) as tolerated. It is best to start at a low dose and work your way up so that you do not have side effects from the fiber supplement, which may include bloating, increased flatulence. You need to make sure you drink plenty of water when you take the fiber so we can keep the stool softer, bulkier, and easier to pass. Hopefully this would improve the consistency of stool, ease of defecation, and resolve any intermittent bleeding.    Obtain labs and stool test. If fecal calprotectin is elevated will need colonoscopy to further evaluate. This was briefly discussed today.  Further recommendations pending above work up.     Electronically signed by: Opal Huff CNP on 8/20/2024 at 3:24 PM

## 2024-08-20 ENCOUNTER — OFFICE VISIT (OUTPATIENT)
Dept: GASTROENTEROLOGY | Facility: HOSPITAL | Age: 39
End: 2024-08-20
Payer: COMMERCIAL

## 2024-08-20 VITALS
SYSTOLIC BLOOD PRESSURE: 132 MMHG | WEIGHT: 222.8 LBS | TEMPERATURE: 97.1 F | DIASTOLIC BLOOD PRESSURE: 83 MMHG | OXYGEN SATURATION: 95 % | BODY MASS INDEX: 32.9 KG/M2 | HEART RATE: 72 BPM

## 2024-08-20 DIAGNOSIS — R10.9 ABDOMINAL CRAMPING: ICD-10-CM

## 2024-08-20 DIAGNOSIS — K58.9 IRRITABLE BOWEL SYNDROME, UNSPECIFIED TYPE: ICD-10-CM

## 2024-08-20 DIAGNOSIS — R19.7 DIARRHEA, UNSPECIFIED TYPE: Primary | ICD-10-CM

## 2024-08-20 PROCEDURE — 1036F TOBACCO NON-USER: CPT | Performed by: NURSE PRACTITIONER

## 2024-08-20 PROCEDURE — 99204 OFFICE O/P NEW MOD 45 MIN: CPT | Performed by: NURSE PRACTITIONER

## 2024-08-20 PROCEDURE — 99214 OFFICE O/P EST MOD 30 MIN: CPT | Performed by: NURSE PRACTITIONER

## 2024-08-20 SDOH — ECONOMIC STABILITY: FOOD INSECURITY: WITHIN THE PAST 12 MONTHS, THE FOOD YOU BOUGHT JUST DIDN'T LAST AND YOU DIDN'T HAVE MONEY TO GET MORE.: NEVER TRUE

## 2024-08-20 SDOH — ECONOMIC STABILITY: FOOD INSECURITY: WITHIN THE PAST 12 MONTHS, YOU WORRIED THAT YOUR FOOD WOULD RUN OUT BEFORE YOU GOT MONEY TO BUY MORE.: NEVER TRUE

## 2024-08-20 ASSESSMENT — PATIENT HEALTH QUESTIONNAIRE - PHQ9
2. FEELING DOWN, DEPRESSED OR HOPELESS: NOT AT ALL
SUM OF ALL RESPONSES TO PHQ9 QUESTIONS 1 & 2: 0
1. LITTLE INTEREST OR PLEASURE IN DOING THINGS: NOT AT ALL

## 2024-08-20 ASSESSMENT — LIFESTYLE VARIABLES
HOW OFTEN DO YOU HAVE A DRINK CONTAINING ALCOHOL: MONTHLY OR LESS
HOW MANY STANDARD DRINKS CONTAINING ALCOHOL DO YOU HAVE ON A TYPICAL DAY: 1 OR 2
HOW OFTEN DO YOU HAVE SIX OR MORE DRINKS ON ONE OCCASION: NEVER
SKIP TO QUESTIONS 9-10: 1
AUDIT-C TOTAL SCORE: 1

## 2024-08-20 ASSESSMENT — PAIN SCALES - GENERAL: PAINLEVEL: 0-NO PAIN

## 2024-08-20 NOTE — PATIENT INSTRUCTIONS
Recommendations  Try to eliminate dairy and processed foods as these are common triggers for IBS.  Start fiber supplement (Metamucil). We would start with 1 teaspoon daily in 8 ounces of water and then after a week increase to 2 teaspoons daily and then in the third week increase to 3 teaspoons daily (1 tablespoon) as tolerated. It is best to start at a low dose and work your way up so that you do not have side effects from the fiber supplement, which may include bloating, increased flatulence. You need to make sure you drink plenty of water when you take the fiber so we can keep the stool softer, bulkier, and easier to pass. Hopefully this would improve the consistency of stool, ease of defecation, and resolve any intermittent bleeding.    Obtain labs and stool test. If fecal calprotectin is elevated will need colonoscopy to further evaluate.  Please call the office at [phone] with any questions or concerns.

## 2024-09-09 ENCOUNTER — APPOINTMENT (OUTPATIENT)
Dept: SLEEP MEDICINE | Facility: CLINIC | Age: 39
End: 2024-09-09
Payer: COMMERCIAL

## 2024-12-03 ENCOUNTER — APPOINTMENT (OUTPATIENT)
Dept: SLEEP MEDICINE | Facility: CLINIC | Age: 39
End: 2024-12-03
Payer: COMMERCIAL

## 2024-12-03 VITALS
DIASTOLIC BLOOD PRESSURE: 70 MMHG | BODY MASS INDEX: 34.07 KG/M2 | HEART RATE: 89 BPM | WEIGHT: 230 LBS | SYSTOLIC BLOOD PRESSURE: 110 MMHG | OXYGEN SATURATION: 97 % | HEIGHT: 69 IN

## 2024-12-03 DIAGNOSIS — G25.81 RESTLESS LEG SYNDROME: ICD-10-CM

## 2024-12-03 DIAGNOSIS — G47.19 EXCESSIVE DAYTIME SLEEPINESS: ICD-10-CM

## 2024-12-03 DIAGNOSIS — E83.10 DISORDER OF IRON METABOLISM: ICD-10-CM

## 2024-12-03 DIAGNOSIS — G47.30 SLEEP-RELATED BREATHING DISORDER: Primary | ICD-10-CM

## 2024-12-03 PROCEDURE — 1036F TOBACCO NON-USER: CPT | Performed by: PHYSICIAN ASSISTANT

## 2024-12-03 PROCEDURE — 99203 OFFICE O/P NEW LOW 30 MIN: CPT | Performed by: PHYSICIAN ASSISTANT

## 2024-12-03 PROCEDURE — 3008F BODY MASS INDEX DOCD: CPT | Performed by: PHYSICIAN ASSISTANT

## 2024-12-03 ASSESSMENT — PAIN SCALES - GENERAL: PAINLEVEL_OUTOF10: 0-NO PAIN

## 2024-12-03 NOTE — PATIENT INSTRUCTIONS
Thank you for coming to the Sleep Medicine Clinic today! Your sleep medicine provider today was: Jt Blancas PA-C Below is a summary of your treatment plan, other important information, and our contact numbers:      TREATMENT PLAN     Call [phone]), option 3 to schedule your sleep study. When you have an appointment please call us back at [phone] to schedule a followup appointment 3-4 weeks after to review results.    Obstructive Sleep Apnea (WALTER) is a sleep disorder where your upper airway muscles relax during sleep and the airway intermittently and repetitively narrows and collapses leading to partially blocked airway (hypopnea) or completely blocked airway (apnea) which, in turn, can disrupt breathing in sleep, lower oxygen levels while you sleep and cause night time wakings. Because both apnea and hypopnea may cause higher carbon dioxide or low oxygen levels, untreated WALTER can lead to heart arrhythmia, elevation of blood pressure, and make it harder for the body to consolidate memory and facilitate metabolism (leading to higher blood sugars at night). Frequent partial arousals occur during sleep resulting in sleep deprivation and daytime sleepiness. WALTER is associated with an increased risk of cardiovascular disease, stroke, hypertension, and insulin resistance. Moreover, untreated WALTER with excessive daytime sleepiness can increase the risk of motor vehicular accidents.    Some conservative strategies for WALTER regardless of WALTER severity are:   Positional therapy - Avoid sleeping on your back.   Healthy diet and regular exercise to optimize weight is highly encouraged.   Avoid alcohol late in the evening and sedative-hypnotics as these substances can make sleep apnea worse.   Improve breathing through the nose with intranasal steroid spray, saline rinse, or antihistamines    Safety: Avoid driving vehicle and operating heavy equipment while sleepy. Drowsy driving may lead to life-threatening  motor vehicle accidents. A person driving while sleepy is 5 times more likely to have an accident. If you feel sleepy, pull over and take a short power nap (sleep for less than 30 minutes). Otherwise, ask somebody to drive you.    Treatment options for sleep apnea include weight management, positional therapy, Positive Airway Therapy (PAP) therapy, oral appliance therapy, hypoglossal nerve stimulator (Inspire) and select airway surgeries.      OUR SLEEP TESTING LOCATIONS     Our team will contact you to schedule your sleep study, however, you can contact us as follow:  Main Phone Line (scheduling only): [phone]), option 3  Adult and Pediatric Locations  [facility] (6 years and older): [facility] - 4th floor ([address]) After hours line: [phone]  [facility] (Main campus: All ages): [facility], 6th floor. After hours line: [phone]   [place] (18 years and older): [address] [facility], 2nd floor   [place] (18 years and older): [address] [facility]; 4th floor  After hours line: [phone]  [facility] (18 years and older) at [place]: [address] [facility]  After hours line: [phone]    [place] (5 years and older; younger considered on case-by-case basis): [address] [facility]; [address], [address]. Scheduling  After hours line: [phone]   [place] (6 years and older): [address]   [place] (6 years and older): [address] [facility] [facility], [address]  After hours line: [phone]   [place] (13 years and older) in [place]: [address], 2nd floor  After hours line: [phone]   [place] (13 year and older): [address]  After hours line: [phone]      IMPORTANT PHONE NUMBERS     Sleep Medicine Clinic Fax: [phone]  Appointments (for Adult Sleep Clinic): [phone] - option 2  Appointments (For Sleep Studies):  [phone] - option 3  Behavioral Sleep Medicine: [phone]    [employer]): [phone]  For clinical questions and refilling prescriptions: [phone]  Consuelo Hart (For Josie/Magalis): P: [phone]  F: [phone]       CONTACTING YOUR SLEEP MEDICINE PROVIDER     Send a message directly to your provider through \"My Chart\", which is the email service through your  Records Account: [email or web address]   Call [phone] and leave a message. One of the administrative assistants will forward the message to your sleep medicine provider through \"My Chart\" and/or email.     Your sleep medicine provider for this visit was: Jt Blancas PA-C  "

## 2024-12-03 NOTE — PROGRESS NOTES
[facility] Sleep Medicine Clinic  New Visit Note        HISTORY OF PRESENT ILLNESS     The patient's referring provider is: No ref. provider found    HISTORY OF PRESENT ILLNESS   Hakan Hawthorne is a 39 y.o. male who presents to a [facility] Sleep Medicine Clinic for a sleep medicine evaluation with concerns of Snoring, Excessive Daytime Sleepiness, Unusual Behaviors During Sleep At Night (Parasomnia), and Restless Legs.     PAST SLEEP HISTORY    Patient has the following sleep-related diagnoses and sleep study results: no past SS    CURRENT HISTORY    On today's visit, the patient reports he snores at night and stops breathing in his sleep.  He also feels sleepy in the daytime.    He does note he works nights usually from 6 AM to 6 PM.  He will work 2-5 times per week this shift.  When he is not working he tries to stay up as late as he can.    He endorses an urge to move his legs.  This is worse when he is resting and better with movement.  Worse in the night.  Drinks pop but otherwise denies caffeine.  Drinks alcohol couple times a week usually.  Not on any prescription medications.    He notes that he has a strong family history of sleep apnea including his mom, his aunt, and uncle, and multiple cousins who have sleep apnea.    STOP  3 STO  BANG 1 G    Sleep schedule  on weekdays / work days:  Usual Bedtime  7-8 am  Falls asleep around  7-830 am  Wake time  1 pm    Sleep schedule  on weekends/non work days :  Usual Bedtime  1-2 am  Falls asleep around 1-2 am  Wake time  8 am    Naps:   1-2 hours around 2 pm    Average sleep duration 4 hours/day    Preferred sleeping position: back, side    Sleep-related ROS:    Sleep Initiation: takes 30+ minutes to fall asleep sometimes    Sleep Maintenance: doesn't wake up often    Recreational drug use  Smoking: never  Alcohol consumption: 2/week  Caffeine consumption:  never  Marijuana: recreational    ESS: 15      PHYSICAL EXAM     VITAL SIGNS: BP  "110/70   Pulse 89   Ht 1.753 m (5' 9\")   Wt 104 kg (230 lb)   SpO2 97%   BMI 33.97 kg/m²      PREVIOUS WEIGHTS:  Wt Readings from Last 3 Encounters:   12/03/24 104 kg (230 lb)   08/20/24 101 kg (222 lb 12.8 oz)   07/02/24 98.5 kg (217 lb 3.2 oz)       PHYSICAL EXAM: GENERAL: alert oriented x 3 pleasant and cooperative no acute distress  MODIFIED MALLAMPATI SCORE: 2+  LATERAL PHARYNGEAL WALL: 3+  NECK EXAM: normal supple no adenopathy    RESULTS/DATA     No results found for: \"IRON\", \"TRANSFERRIN\", \"IRONSAT\", \"TIBC\", \"FERRITIN\"    ASSESSMENT/PLAN     Mr. Hawthorne is a 39 y.o. male and was referred to the [facility] for the following issues:    OBSTRUCTIVE SLEEP APNEA / SLEEPINESS/ FREQUENT WAKING  -Ordering sleep study to evaluate    BMI>30  -Body mass index is 33.97 kg/m².  today  -With sufficient weight loss may no longer require treatment for WALTER    RESTLESS LEG SYNDROME  -Check ferritin level  -Conservative therapy includes massage, weighted blanket, distraction therapy, warm bath prior to bed    Followup 3 weeks after sleep study to review results        "

## 2025-01-29 ENCOUNTER — CLINICAL SUPPORT (OUTPATIENT)
Dept: SLEEP MEDICINE | Facility: HOSPITAL | Age: 40
End: 2025-01-29
Payer: COMMERCIAL

## 2025-01-29 DIAGNOSIS — G47.30 SLEEP-RELATED BREATHING DISORDER: ICD-10-CM

## 2025-01-29 NOTE — PROGRESS NOTES
Type of Study: HOME SLEEP STUDY - NOMAD     The patient received equipment and instructions for use of the [employer] [facility] Nomad HSAT device. The patient was instructed how to apply the effort belts, cannula, thermistor. It was also explained how the Nomad and oximeter components work.  The patient was asked to record their sleep for an 8-hour period.     The patient was informed of their responsibility for the device and acknowledged this by signing the HSAT device contract. The patient was asked to return the device on 1/30/2025 between the hours of 3410-9281 to the Sleep Center.     The patient was instructed to call 911 as usual for any medical- emergencies while at home.  The patient was also given a phone number for troubleshooting when using the device in case there were additional questions.

## 2025-02-07 ENCOUNTER — TELEPHONE (OUTPATIENT)
Dept: SLEEP MEDICINE | Facility: CLINIC | Age: 40
End: 2025-02-07

## 2025-02-07 NOTE — TELEPHONE ENCOUNTER
----- Message from Jt Blancas sent at 2/7/2025 11:18 AM EST -----  Please schedule appointment to review sleep study results.

## 2025-02-26 ENCOUNTER — APPOINTMENT (OUTPATIENT)
Dept: SLEEP MEDICINE | Facility: CLINIC | Age: 40
End: 2025-02-26
Payer: COMMERCIAL

## 2025-02-26 VITALS
WEIGHT: 240 LBS | HEIGHT: 69 IN | DIASTOLIC BLOOD PRESSURE: 96 MMHG | SYSTOLIC BLOOD PRESSURE: 138 MMHG | OXYGEN SATURATION: 96 % | BODY MASS INDEX: 35.55 KG/M2 | HEART RATE: 79 BPM

## 2025-02-26 DIAGNOSIS — G47.33 OBSTRUCTIVE SLEEP APNEA SYNDROME: Primary | ICD-10-CM

## 2025-02-26 DIAGNOSIS — G47.19 EXCESSIVE DAYTIME SLEEPINESS: ICD-10-CM

## 2025-02-26 PROCEDURE — 1036F TOBACCO NON-USER: CPT | Performed by: PHYSICIAN ASSISTANT

## 2025-02-26 PROCEDURE — 3008F BODY MASS INDEX DOCD: CPT | Performed by: PHYSICIAN ASSISTANT

## 2025-02-26 PROCEDURE — 99213 OFFICE O/P EST LOW 20 MIN: CPT | Performed by: PHYSICIAN ASSISTANT

## 2025-02-26 ASSESSMENT — SLEEP AND FATIGUE QUESTIONNAIRES
HOW LIKELY ARE YOU TO NOD OFF OR FALL ASLEEP WHEN YOU ARE A PASSENGER IN A CAR FOR AN HOUR WITHOUT A BREAK: HIGH CHANCE OF DOZING
HOW LIKELY ARE YOU TO NOD OFF OR FALL ASLEEP WHILE SITTING QUIETLY AFTER LUNCH WITHOUT ALCOHOL: MODERATE CHANCE OF DOZING
SITING INACTIVE IN A PUBLIC PLACE LIKE A CLASS ROOM OR A MOVIE THEATER: MODERATE CHANCE OF DOZING
ESS-CHAD TOTAL SCORE: 13
HOW LIKELY ARE YOU TO NOD OFF OR FALL ASLEEP WHILE LYING DOWN TO REST IN THE AFTERNOON WHEN CIRCUMSTANCES PERMIT: SLIGHT CHANCE OF DOZING
HOW LIKELY ARE YOU TO NOD OFF OR FALL ASLEEP WHILE WATCHING TV: SLIGHT CHANCE OF DOZING
HOW LIKELY ARE YOU TO NOD OFF OR FALL ASLEEP WHILE SITTING AND READING: MODERATE CHANCE OF DOZING
HOW LIKELY ARE YOU TO NOD OFF OR FALL ASLEEP WHILE SITTING AND TALKING TO SOMEONE: SLIGHT CHANCE OF DOZING
HOW LIKELY ARE YOU TO NOD OFF OR FALL ASLEEP IN A CAR, WHILE STOPPED FOR A FEW MINUTES IN TRAFFIC: SLIGHT CHANCE OF DOZING

## 2025-02-26 ASSESSMENT — PAIN SCALES - GENERAL: PAINLEVEL_OUTOF10: 0-NO PAIN

## 2025-02-26 NOTE — PROGRESS NOTES
"[facility] Sleep Medicine Clinic  Followup Visit Note    HISTORY OF PRESENT ILLNESS   Hakan Hawthorne is a 39 y.o. male who presents to a [facility] Sleep Medicine Clinic for followup.       Current History    His sleep study was consistent with severe sleep apnea with AHI of 32.6 and spo2<88% for 13 minutes.    He is open to CPAP for daytime sleepiness, staying asleep better, lower risk of CVD.    Did not have iron checked.    Sleep Scales:  ESS: 13     REVIEW OF SYSTEMS    All other systems negative      PHYSICAL EXAM     VITAL SIGNS: BP (!) 138/96   Pulse 79   Ht 1.753 m (5' 9\")   Wt 109 kg (240 lb)   SpO2 96%   BMI 35.44 kg/m²      PREVIOUS WEIGHTS:  Wt Readings from Last 3 Encounters:   02/26/25 109 kg (240 lb)   12/03/24 104 kg (230 lb)   08/20/24 101 kg (222 lb 12.8 oz)       Constitutional: Alert and oriented, cooperative, no obvious distress   HEENT: Non icteric or anemic, EOM WNL bilaterally   Neck: Supple, no JVD, no goiter, no adenopathy, no rigidity  Extremities: No clubbing, no LL edema   Neuromuscular: Cranial nerves grossly intact, no focal deficits     RESULTS/DATA     No results found for: \"IRON\", \"TRANSFERRIN\", \"IRONSAT\", \"TIBC\", \"FERRITIN\"      ASSESSMENT/PLAN     Mr. Hawthorne is a 39 y.o. male and returns in followup for the following issues:    OBSTRUCTIVE SLEEP APNEA / SLEEPINESS / FREQUENT WAKING  -Reviewed test results  -[facility]AP [facility] ordered with pressure 5-15 cm H2O  -Goal of CPAP includes less daytime sleepiness, less waking at night, lower CVD risk    BMI>30  -Body mass index is 35.44 kg/m².  today  -With sufficient weight loss may no longer require treatment for WALTER    RLS  -hasn't yet had iron checked    Followup 31-90 days after starting CPAP     "

## 2025-02-26 NOTE — PATIENT INSTRUCTIONS
Good seeing you today,    Order sent to [facility] with auto pressure 5-15 cm H2O. If you feel uncomfortable with pressure settings let me know and I can change them online.    Some mask options I recommend    Resmed N30i nasal mask (tube on top of head) - good option if you toss and turn a lot  Resmed N30 nasal mask (tube in front)  Malik zohra fx nasal pillow mask (tube in front)  Resmed P10 nasal pillow mask (tube in front)  Resmed P30i nasal mask (tube on top of head) - good option if you toss and turn a lot    You can change humidity settings based on comfort    We will plan on seeing you back in 31-90 days for a required compliance appointment. Try to use at least 4 hours per night for >70% of nights.     Jt Blancas PA-C    IMPORTANT PHONE NUMBERS     [phone]  [employer]): [phone]  For clinical questions and refilling prescriptions: [phone]  Kenzie Glover (For Josie/Magalis): P: [phone]

## 2025-10-07 ENCOUNTER — APPOINTMENT (OUTPATIENT)
Dept: SLEEP MEDICINE | Facility: CLINIC | Age: 40
End: 2025-10-07
Payer: COMMERCIAL